# Patient Record
Sex: FEMALE
[De-identification: names, ages, dates, MRNs, and addresses within clinical notes are randomized per-mention and may not be internally consistent; named-entity substitution may affect disease eponyms.]

---

## 2017-10-04 ENCOUNTER — HOSPITAL ENCOUNTER (EMERGENCY)
Dept: HOSPITAL 5 - ED | Age: 47
Discharge: HOME | End: 2017-10-04
Payer: MEDICARE

## 2017-10-04 VITALS — SYSTOLIC BLOOD PRESSURE: 139 MMHG | DIASTOLIC BLOOD PRESSURE: 83 MMHG

## 2017-10-04 DIAGNOSIS — I10: ICD-10-CM

## 2017-10-04 DIAGNOSIS — N23: Primary | ICD-10-CM

## 2017-10-04 LAB
ANION GAP SERPL CALC-SCNC: 18 MMOL/L
BASOPHILS NFR BLD AUTO: 0.2 % (ref 0–1.8)
BILIRUB UR QL STRIP: (no result)
BLOOD UR QL VISUAL: (no result)
BUN SERPL-MCNC: 15 MG/DL (ref 7–17)
BUN/CREAT SERPL: 21 %
CALCIUM SERPL-MCNC: 9.3 MG/DL (ref 8.4–10.2)
CHLORIDE SERPL-SCNC: 101.2 MMOL/L (ref 98–107)
CO2 SERPL-SCNC: 26 MMOL/L (ref 22–30)
EOSINOPHIL NFR BLD AUTO: 0.4 % (ref 0–4.3)
GLUCOSE SERPL-MCNC: 91 MG/DL (ref 65–100)
HCT VFR BLD CALC: 46.1 % (ref 30.3–42.9)
HGB BLD-MCNC: 15 GM/DL (ref 10.1–14.3)
KETONES UR STRIP-MCNC: (no result) MG/DL
LEUKOCYTE ESTERASE UR QL STRIP: (no result)
MCH RBC QN AUTO: 28 PG (ref 28–32)
MCHC RBC AUTO-ENTMCNC: 33 % (ref 30–34)
MCV RBC AUTO: 87 FL (ref 79–97)
MUCOUS THREADS #/AREA URNS HPF: (no result) /HPF
NITRITE UR QL STRIP: (no result)
PH UR STRIP: 5 [PH] (ref 5–7)
PLATELET # BLD: 291 K/MM3 (ref 140–440)
POTASSIUM SERPL-SCNC: 4.4 MMOL/L (ref 3.6–5)
PROT UR STRIP-MCNC: (no result) MG/DL
RBC # BLD AUTO: 5.31 M/MM3 (ref 3.65–5.03)
RBC #/AREA URNS HPF: 14 /HPF (ref 0–6)
SODIUM SERPL-SCNC: 141 MMOL/L (ref 137–145)
UROBILINOGEN UR-MCNC: < 2 MG/DL (ref ?–2)
WBC # BLD AUTO: 14.9 K/MM3 (ref 4.5–11)
WBC #/AREA URNS HPF: 1 /HPF (ref 0–6)

## 2017-10-04 PROCEDURE — 74176 CT ABD & PELVIS W/O CONTRAST: CPT

## 2017-10-04 PROCEDURE — 81025 URINE PREGNANCY TEST: CPT

## 2017-10-04 PROCEDURE — 87086 URINE CULTURE/COLONY COUNT: CPT

## 2017-10-04 PROCEDURE — 99284 EMERGENCY DEPT VISIT MOD MDM: CPT

## 2017-10-04 PROCEDURE — 36415 COLL VENOUS BLD VENIPUNCTURE: CPT

## 2017-10-04 PROCEDURE — 96374 THER/PROPH/DIAG INJ IV PUSH: CPT

## 2017-10-04 PROCEDURE — 85025 COMPLETE CBC W/AUTO DIFF WBC: CPT

## 2017-10-04 PROCEDURE — 96361 HYDRATE IV INFUSION ADD-ON: CPT

## 2017-10-04 PROCEDURE — 81001 URINALYSIS AUTO W/SCOPE: CPT

## 2017-10-04 PROCEDURE — 80048 BASIC METABOLIC PNL TOTAL CA: CPT

## 2017-10-04 PROCEDURE — 96375 TX/PRO/DX INJ NEW DRUG ADDON: CPT

## 2017-10-04 PROCEDURE — 82550 ASSAY OF CK (CPK): CPT

## 2017-10-04 NOTE — EMERGENCY DEPARTMENT REPORT
ED Female  HPI





- General


Chief complaint: Nausea/Vomiting/Diarrhea


Stated complaint: KIDNEY PROBLEMS, NAUSEA AND BACK PAIN


Time Seen by Provider: 10/04/17 18:32


Source: patient


Mode of arrival: Ambulatory


Limitations: No Limitations





- History of Present Illness


Initial comments: 


47-year-old female past medical history CVA, kidney stones, lithotripsy, obesity

, lupus, residual right upper extremity weakness presents with complaint of 

sudden onset right flank pain since this morning.  Radiates slightly to the 

groin.  States that she has had bouts of nausea and pain has been 

intermittently sharp but not overwhelming denies dysuria but does state that 

urine was slightly dark and possibly had an odor.  Denies fevers chills chest 

pain abdominal pain.  Does state she has some suprapubic discomfort.  LMP is 

now.  Patient is awake alert and oriented 3 accompanied by her daughter.


MD Complaint: pelvic pain


-: This morning


Radiation: R flank


Severity: moderate


Severity scale (0 -10): 6


Quality: sharp


Consistency: constant


Improves with: none


Worsens with: none


Are you Pregnant Now?: No


Last Menstrual Period: 10/04/17


EDC: 07/11/18





- Related Data


Sexually active: No


 Home Medications











 Medication  Instructions  Recorded  Confirmed  Last Taken


 


Gabapentin 600 mg PO BID 06/07/15 03/04/16 06/06/15


 


ALPRAZolam [Xanax TAB] 2 mg PO PRN PRN 03/04/16 03/04/16 Unknown


 


Sertraline [Zoloft] 50 mg PO DAILY 03/04/16 03/04/16 Unknown








 Previous Rx's











 Medication  Instructions  Recorded  Last Taken  Type


 


Hydrocodone Bit/Acetaminophen 1 each PO Q6H PRN #60 tablet 09/19/13 06/06/15 Rx





[Lortab  mg]    


 


Calcium Carbonate/Vitamin D3 1 each PO DAILY #30 tablet 03/19/14 06/06/15 Rx





[Calcium 600-Vit D3 400 Tablet]    


 


Diltiazem [Cardizem] 60 mg PO QDAY #30 tablet 03/19/14 06/06/15 Rx


 


Tizanidine HCl [Zanaflex] 4 mg PO TID PRN #90 tablet 03/19/14 06/06/15 Rx


 


Zolpidem [Ambien] 10 mg PO QHS PRN #30 tablet 03/19/14 06/06/15 Rx


 


Aspirin [Aspirin BABY CHEW TAB] 81 mg PO QDAY #30 tab 06/10/15 Unknown Rx


 


AtorvaSTATin [Lipitor] 20 mg PO QHS #30 tablet 06/10/15 Unknown Rx


 


HYDROcodone/APAP  [Norco 1 each PO Q6H PRN #30 tablet 06/10/15 Unknown Rx





 mg TAB]    


 


Promethazine [Phenergan TAB] 25 mg PO Q6H PRN #30 tablet 06/10/15 Unknown Rx


 


predniSONE [Deltasone] 20 mg PO QDAY #5 tab 03/04/16 Unknown Rx


 


HYDROcodone/APAP 5-325 [Temple 1 each PO Q6HR PRN #18 tablet 10/04/17 Unknown Rx





5/325]    


 


Naproxen [Naprosyn TAB] 375 mg PO BID PRN #20 tablet 10/04/17 Unknown Rx


 


Ondansetron [Zofran Odt] 4 mg PO Q8H PRN #15 tab.rapdis 10/04/17 Unknown Rx


 


Tamsulosin [Flomax] 0.4 mg PO QDAY #10 cap 10/04/17 Unknown Rx











 Allergies











Allergy/AdvReac Type Severity Reaction Status Date / Time


 


povidone-iodine Allergy  Hives Verified 09/16/13 13:33





[From Betadine]     


 


soap [From Betadine] Allergy  Hives Verified 09/16/13 13:33


 


tomato [Tomato] Allergy  Angioedema Verified 09/16/13 13:37


 


latex AdvReac  Hives Verified 06/07/15 00:02














ED Review of Systems


ROS: 


Stated complaint: KIDNEY PROBLEMS, NAUSEA AND BACK PAIN


Other details as noted in HPI





Constitutional: denies: chills, fever


Eyes: denies: eye pain, eye discharge, vision change


ENT: denies: ear pain, throat pain


Respiratory: denies: cough, shortness of breath, wheezing


Cardiovascular: denies: chest pain, palpitations


Endocrine: no symptoms reported


Gastrointestinal: denies: abdominal pain, nausea, diarrhea


Genitourinary: denies: urgency, dysuria, discharge


Musculoskeletal: denies: back pain, joint swelling, arthralgia


Skin: denies: rash, lesions


Neurological: denies: headache, weakness, paresthesias


Psychiatric: denies: anxiety, depression


Hematological/Lymphatic: denies: easy bleeding, easy bruising





ED Past Medical Hx





- Past Medical History


Previous Medical History?: Yes


Hx Hypertension: Yes


Hx CVA: Yes (R.Side weakness)


Hx Heart Attack/AMI: No


Hx Congestive Heart Failure: No


Hx Diabetes: No


Hx Deep Vein Thrombosis: Yes


Hx Pulmonary Embolism: Yes


Hx GERD: No


Hx Liver Disease: No


Hx Renal Disease: No


Hx Sickle Cell Disease: No


Hx Arthritis: Yes


Hx Headaches / Migraines: No


Hx Seizures: No


Hx Kidney Stones: Yes


Hx Asthma: No


Hx COPD: No


Hx Tuberculosis: No


Hx Dementia: No


Hx HIV: No


Additional medical history: Lupus





- Surgical History


Hx Coronary Stent: No


Hx Pacemaker: No


Hx Internal Defibrillator: No


Hx Cholecystectomy: Yes


Hx Appendectomy: Yes


Additional Surgical History: Tonisillectomy, Endometriosis.





- Social History


Smoking Status: Never Smoker


Substance Use Type: Prescribed





- Medications


Home Medications: 


 Home Medications











 Medication  Instructions  Recorded  Confirmed  Last Taken  Type


 


Hydrocodone Bit/Acetaminophen 1 each PO Q6H PRN #60 tablet 09/19/13 03/04/16 06/

06/15 Rx





[Lortab  mg]     


 


Calcium Carbonate/Vitamin D3 1 each PO DAILY #30 tablet 03/19/14 03/04/16 06/06/

15 Rx





[Calcium 600-Vit D3 400 Tablet]     


 


Diltiazem [Cardizem] 60 mg PO QDAY #30 tablet 03/19/14 03/04/16 06/06/15 Rx


 


Tizanidine HCl [Zanaflex] 4 mg PO TID PRN #90 tablet 03/19/14 03/04/16 06/06/15 

Rx


 


Zolpidem [Ambien] 10 mg PO QHS PRN #30 tablet 03/19/14 03/04/16 06/06/15 Rx


 


Gabapentin 600 mg PO BID 06/07/15 03/04/16 06/06/15 History


 


Aspirin [Aspirin BABY CHEW TAB] 81 mg PO QDAY #30 tab 06/10/15 03/04/16 Unknown 

Rx


 


AtorvaSTATin [Lipitor] 20 mg PO QHS #30 tablet 06/10/15 03/04/16 Unknown Rx


 


HYDROcodone/APAP  [Norco 1 each PO Q6H PRN #30 tablet 06/10/15 03/04/16 

Unknown Rx





 mg TAB]     


 


Promethazine [Phenergan TAB] 25 mg PO Q6H PRN #30 tablet 06/10/15 03/04/16 

Unknown Rx


 


ALPRAZolam [Xanax TAB] 2 mg PO PRN PRN 03/04/16 03/04/16 Unknown History


 


Sertraline [Zoloft] 50 mg PO DAILY 03/04/16 03/04/16 Unknown History


 


predniSONE [Deltasone] 20 mg PO QDAY #5 tab 03/04/16  Unknown Rx


 


HYDROcodone/APAP 5-325 [Temple 1 each PO Q6HR PRN #18 tablet 10/04/17  Unknown Rx





5/325]     


 


Naproxen [Naprosyn TAB] 375 mg PO BID PRN #20 tablet 10/04/17  Unknown Rx


 


Ondansetron [Zofran Odt] 4 mg PO Q8H PRN #15 tab.rapdis 10/04/17  Unknown Rx


 


Tamsulosin [Flomax] 0.4 mg PO QDAY #10 cap 10/04/17  Unknown Rx














ED Physical Exam





- General


Limitations: No Limitations


General appearance: alert, in no apparent distress





- Head


Head exam: Present: atraumatic, normocephalic





- Eye


Eye exam: Present: normal appearance, PERRL, EOMI





- ENT


ENT exam: Present: mucous membranes moist





- Neck


Neck exam: Present: normal inspection





- Respiratory


Respiratory exam: Present: normal lung sounds bilaterally.  Absent: respiratory 

distress





- Cardiovascular


Cardiovascular Exam: Present: regular rate, normal rhythm.  Absent: systolic 

murmur, diastolic murmur, rubs, gallop





- GI/Abdominal


GI/Abdominal exam: Present: soft, normal bowel sounds





- Extremities Exam


Extremities exam: Present: normal inspection





- Back Exam


Back exam: Present: normal inspection, CVA tenderness (R) (right-sided flank 

pain on exam)





- Neurological Exam


Neurological exam: Present: alert, oriented X3, CN II-XII intact, normal gait





- Psychiatric


Psychiatric exam: Present: normal affect, normal mood





- Skin


Skin exam: Present: warm, dry, intact, normal color.  Absent: rash





ED Course


 Vital Signs











  10/04/17





  16:47


 


Temperature 98.4 F


 


Pulse Rate 105 H


 


Respiratory 20





Rate 


 


Blood Pressure 145/88


 


O2 Sat by Pulse 98





Oximetry 














ED Medical Decision Making





- Lab Data


Result diagrams: 


 10/04/17 16:57





 10/04/17 16:57





- Medical Decision Making


A/P: Renal colic right side


1- short course norco, Flomax,naproxen.  Patient is able to tolerate fluids.  

Zofran when necessary.


2- follow-up with [facility]


3- follow-up with primary care doctor


4- patient is voiding urine without significant difficulty


5- case discussed with Dr. Jack before discharge 


6- UA shows no nitries or leukocytes


Critical care attestation.: 


If time is entered above; I have spent that time in minutes in the direct care 

of this critically ill patient, excluding procedure time.








ED Disposition


Clinical Impression: 


 Renal colic on right side





Disposition: DC-01 TO HOME OR SELFCARE


Is pt being admited?: No


Does the pt Need Aspirin: No


Condition: Stable


Instructions:  Renal Colic (ED)


Prescriptions: 


HYDROcodone/APAP 5-325 [Temple 5/325] 1 each PO Q6HR PRN #18 tablet


 PRN Reason: Pain


Naproxen [Naprosyn TAB] 375 mg PO BID PRN #20 tablet


 PRN Reason: Pain


Ondansetron [Zofran Odt] 4 mg PO Q8H PRN #15 tab.rapdis


 PRN Reason: Nausea


Tamsulosin [Flomax] 0.4 mg PO QDAY #10 cap


Referrals: 


[facility] [Provider Group] - 3-5 Days


[facility] [Outside] - 3-5 Days


[facility] [Outside] - 3-5 Days


Forms:  Work/School Release Form(ED)


Time of Disposition: 21:35

## 2017-10-04 NOTE — CAT SCAN REPORT
FINAL REPORT



PROCEDURE:  CT ABDOMEN PELVIS WO CON



TECHNIQUE:  Computerized axial tomography of the abdomen and

pelvis was performed without intravenous contrast. This study is

performed without intravascular contrast material and its

sensitivity for abdominal and pelvic pathology, including

neoplasms, inflammation, abscess, free fluid, thrombosis,

arterial dissection and infarction, is reduced compared with a

contrast enhanced study. 



HISTORY:  flank pain ? renal stone , pending preg test 



COMPARISON:  No prior studies are available for comparison.



FINDINGS:  

Lower Lung fields: No focal abnormality seen.



Upper Abdomen: Gallbladder is surgically absent. The unenhanced

images of the liver are unremarkable. The adrenal glands, the

pancreas and the spleen are unremarkable.



Kidneys, Ureters and Urinary bladder: There are 2 nonobstructing

calculi in the lower 3rd of the right kidney measuring up to 2

millimeters. No other renal calculi are seen on the right or

left. No masses are identified. There is no hydronephrosis. The

ureters are not distended. No ureteral calculi are visualized.

Calcifications are seen in the lower pelvis which appear to

represent phleboliths. Urinary bladder is unremarkable. 



Retroperitoneum: Atherosclerotic changes are seen in the

abdominal aorta.  No aneurysm is visualized.



Nonspecific subcentimeter lymph nodes are seen in the

retroperitoneum. No pathologically enlarged lymph nodes are

identified. 



Bowel: Bowel loops are unremarkable. No evidence of bowel

obstruction ascites or free intraperitoneal gas. Normal-appearing

appendix is visualized in the right lower quadrant.



Reproductive organs: Uterus and adnexa show no focal

abnormalities. Gas density is seen in the vagina likely a tampon.



Other: No acute bony abnormalities are visualized.



IMPRESSION:  

Small nonobstructing renal calculi are seen in the lower 3rd of

the right kidney., The kidneys ureters and urinary bladder

otherwise are unremarkable.



Prior cholecystectomy.



No other abnormalities are seen..

## 2018-06-15 ENCOUNTER — HOSPITAL ENCOUNTER (EMERGENCY)
Dept: HOSPITAL 5 - ED | Age: 48
LOS: 1 days | Discharge: LEFT BEFORE DISCHARGE | End: 2018-06-16
Payer: MEDICARE

## 2018-06-15 VITALS — SYSTOLIC BLOOD PRESSURE: 157 MMHG | DIASTOLIC BLOOD PRESSURE: 83 MMHG

## 2018-06-15 DIAGNOSIS — Z53.21: ICD-10-CM

## 2018-06-15 DIAGNOSIS — R07.9: Primary | ICD-10-CM

## 2018-06-15 LAB
BASOPHILS # (AUTO): 0 K/MM3 (ref 0–0.1)
BASOPHILS NFR BLD AUTO: 0.6 % (ref 0–1.8)
BUN SERPL-MCNC: 16 MG/DL (ref 7–17)
BUN/CREAT SERPL: 20 %
CALCIUM SERPL-MCNC: 9.5 MG/DL (ref 8.4–10.2)
EOSINOPHIL # BLD AUTO: 0.1 K/MM3 (ref 0–0.4)
EOSINOPHIL NFR BLD AUTO: 1.3 % (ref 0–4.3)
HCT VFR BLD CALC: 46.7 % (ref 30.3–42.9)
HEMOLYSIS INDEX: 16
HGB BLD-MCNC: 15.4 GM/DL (ref 10.1–14.3)
LYMPHOCYTES # BLD AUTO: 2.7 K/MM3 (ref 1.2–5.4)
LYMPHOCYTES NFR BLD AUTO: 31 % (ref 13.4–35)
MCH RBC QN AUTO: 28 PG (ref 28–32)
MCHC RBC AUTO-ENTMCNC: 33 % (ref 30–34)
MCV RBC AUTO: 86 FL (ref 79–97)
MONOCYTES # (AUTO): 0.6 K/MM3 (ref 0–0.8)
MONOCYTES % (AUTO): 6.4 % (ref 0–7.3)
PLATELET # BLD: 302 K/MM3 (ref 140–440)
RBC # BLD AUTO: 5.42 M/MM3 (ref 3.65–5.03)

## 2018-06-15 PROCEDURE — 85025 COMPLETE CBC W/AUTO DIFF WBC: CPT

## 2018-06-15 PROCEDURE — 93005 ELECTROCARDIOGRAM TRACING: CPT

## 2018-06-15 PROCEDURE — 36415 COLL VENOUS BLD VENIPUNCTURE: CPT

## 2018-06-15 PROCEDURE — 93010 ELECTROCARDIOGRAM REPORT: CPT

## 2018-06-15 PROCEDURE — 84484 ASSAY OF TROPONIN QUANT: CPT

## 2018-06-15 PROCEDURE — 80048 BASIC METABOLIC PNL TOTAL CA: CPT
